# Patient Record
Sex: MALE | Race: WHITE | NOT HISPANIC OR LATINO
[De-identification: names, ages, dates, MRNs, and addresses within clinical notes are randomized per-mention and may not be internally consistent; named-entity substitution may affect disease eponyms.]

---

## 2020-01-06 ENCOUNTER — APPOINTMENT (OUTPATIENT)
Dept: OTOLARYNGOLOGY | Facility: CLINIC | Age: 75
End: 2020-01-06
Payer: MEDICARE

## 2020-01-06 VITALS — BODY MASS INDEX: 30.16 KG/M2 | WEIGHT: 235 LBS | HEIGHT: 74 IN

## 2020-01-06 VITALS
TEMPERATURE: 97.8 F | SYSTOLIC BLOOD PRESSURE: 123 MMHG | HEART RATE: 67 BPM | OXYGEN SATURATION: 99 % | DIASTOLIC BLOOD PRESSURE: 75 MMHG

## 2020-01-06 DIAGNOSIS — Z86.79 PERSONAL HISTORY OF OTHER DISEASES OF THE CIRCULATORY SYSTEM: ICD-10-CM

## 2020-01-06 DIAGNOSIS — H92.09 OTALGIA, UNSPECIFIED EAR: ICD-10-CM

## 2020-01-06 DIAGNOSIS — H90.6 MIXED CONDUCTIVE AND SENSORINEURAL HEARING LOSS, BILATERAL: ICD-10-CM

## 2020-01-06 DIAGNOSIS — Z82.49 FAMILY HISTORY OF ISCHEMIC HEART DISEASE AND OTHER DISEASES OF THE CIRCULATORY SYSTEM: ICD-10-CM

## 2020-01-06 DIAGNOSIS — Z78.9 OTHER SPECIFIED HEALTH STATUS: ICD-10-CM

## 2020-01-06 DIAGNOSIS — H91.90 UNSPECIFIED HEARING LOSS, UNSPECIFIED EAR: ICD-10-CM

## 2020-01-06 DIAGNOSIS — F17.210 NICOTINE DEPENDENCE, CIGARETTES, UNCOMPLICATED: ICD-10-CM

## 2020-01-06 DIAGNOSIS — H92.03 OTALGIA, BILATERAL: ICD-10-CM

## 2020-01-06 PROBLEM — Z00.00 ENCOUNTER FOR PREVENTIVE HEALTH EXAMINATION: Status: ACTIVE | Noted: 2020-01-06

## 2020-01-06 PROCEDURE — 92550 TYMPANOMETRY & REFLEX THRESH: CPT

## 2020-01-06 PROCEDURE — 92557 COMPREHENSIVE HEARING TEST: CPT

## 2020-01-06 PROCEDURE — 99203 OFFICE O/P NEW LOW 30 MIN: CPT

## 2020-01-06 RX ORDER — CANDESARTAN CILEXETIL 4 MG/1
TABLET ORAL
Refills: 0 | Status: ACTIVE | COMMUNITY

## 2020-01-06 RX ORDER — CHLORTHALIDONE 50 MG/1
TABLET ORAL
Refills: 0 | Status: ACTIVE | COMMUNITY

## 2020-01-06 RX ORDER — ASPIRIN 325 MG/1
TABLET, FILM COATED ORAL
Refills: 0 | Status: ACTIVE | COMMUNITY

## 2020-01-06 RX ORDER — EPLERENONE 25 MG/1
25 TABLET, COATED ORAL
Refills: 0 | Status: ACTIVE | COMMUNITY

## 2020-01-06 RX ORDER — ATENOLOL 50 MG/1
50 TABLET ORAL
Refills: 0 | Status: ACTIVE | COMMUNITY

## 2020-01-06 RX ORDER — TAMSULOSIN HYDROCHLORIDE 0.4 MG/1
CAPSULE ORAL
Refills: 0 | Status: ACTIVE | COMMUNITY

## 2020-01-06 RX ORDER — ATORVASTATIN CALCIUM 40 MG/1
40 TABLET, FILM COATED ORAL
Refills: 0 | Status: ACTIVE | COMMUNITY

## 2020-01-06 RX ORDER — ALLOPURINOL 200 MG/1
TABLET ORAL
Refills: 0 | Status: ACTIVE | COMMUNITY

## 2020-01-06 NOTE — HISTORY OF PRESENT ILLNESS
[de-identified] : 75 yo man - he and his wife had uri wife pneumonia and rsv 12'/3-4 caught in [place] - 12/ 9 had blood in ear on left saw urgent care in [place] told tm perf -  took prednisone 4d and then keflex 4d - after this he was told his ears looked better - but he remained congested. He then flew to [place] and ears bothered him more so went to urgent care and was told his ears looked ok but he still felt severe congestion in them- he then noticed his hearing had decreased in b ears. . pain is moderate and dull and switches between the ears. Nonsmoker. No fh related to cc

## 2020-01-10 ENCOUNTER — APPOINTMENT (OUTPATIENT)
Dept: OTOLARYNGOLOGY | Facility: CLINIC | Age: 75
End: 2020-01-10
Payer: MEDICARE

## 2020-01-10 VITALS
DIASTOLIC BLOOD PRESSURE: 84 MMHG | SYSTOLIC BLOOD PRESSURE: 134 MMHG | OXYGEN SATURATION: 99 % | HEART RATE: 66 BPM | TEMPERATURE: 97.5 F | RESPIRATION RATE: 15 BRPM

## 2020-01-10 DIAGNOSIS — H90.3 SENSORINEURAL HEARING LOSS, BILATERAL: ICD-10-CM

## 2020-01-10 PROCEDURE — 92550 TYMPANOMETRY & REFLEX THRESH: CPT

## 2020-01-10 PROCEDURE — 99213 OFFICE O/P EST LOW 20 MIN: CPT

## 2020-01-10 PROCEDURE — 92557 COMPREHENSIVE HEARING TEST: CPT

## 2020-01-10 NOTE — DATA REVIEWED
[de-identified] : ct resolving rik and l tmj - reviewed with pt [de-identified] : b downsloping snhl symmetric ab gaps were small last time but gone now and tymps have better traces.

## 2020-01-10 NOTE — HISTORY OF PRESENT ILLNESS
[de-identified] : followup 73 yo man with b mhl and rik - he reports that his ears have been feeling better over the past few days. came on acutely about a month ago with viral synd. had ct and is here to review. he feels he now has moderate hl; before it was severe.

## 2020-01-13 ENCOUNTER — TRANSCRIPTION ENCOUNTER (OUTPATIENT)
Age: 75
End: 2020-01-13

## 2020-01-15 ENCOUNTER — TRANSCRIPTION ENCOUNTER (OUTPATIENT)
Age: 75
End: 2020-01-15

## 2020-01-24 ENCOUNTER — APPOINTMENT (OUTPATIENT)
Dept: PHARMACY | Facility: CLINIC | Age: 75
End: 2020-01-24

## 2022-07-06 ENCOUNTER — NON-APPOINTMENT (OUTPATIENT)
Age: 77
End: 2022-07-06

## 2023-03-28 ENCOUNTER — NON-APPOINTMENT (OUTPATIENT)
Age: 78
End: 2023-03-28

## 2023-03-28 ENCOUNTER — APPOINTMENT (OUTPATIENT)
Dept: OPHTHALMOLOGY | Facility: CLINIC | Age: 78
End: 2023-03-28
Payer: MEDICARE

## 2023-03-28 PROCEDURE — 92134 CPTRZ OPH DX IMG PST SGM RTA: CPT

## 2023-03-28 PROCEDURE — 92004 COMPRE OPH EXAM NEW PT 1/>: CPT

## 2023-03-28 PROCEDURE — 92136 OPHTHALMIC BIOMETRY: CPT

## 2023-06-18 ENCOUNTER — NON-APPOINTMENT (OUTPATIENT)
Age: 78
End: 2023-06-18

## 2023-07-08 NOTE — OPERATIVE REPORT - OPERATIVE RPOSRT DETAILS
DATE OF SURGERY:July 12, 2023    Surgeon:  Vero Saucedo    PRE-OP DIAGNOSIS: Cataract Right Eye    POST-OP DIAGNOSIS: Same    ANESTHESIA: MAC    PROCEDURE: Cataract extraction with intraocular lens implant Right eye    SPECIMEN/TISSUE REMOVED: None    ESTIMATED BLOOD LOSS: < 1mL    COMPLICATIONS: None    The patient was brought to the operating room.  A drop of topical anesthetic was placed in the eye. The patient was prepped and draped in the usual sterile fashion, including Betadine drops in the eye. A lid speculum was placed between the lids of the right eye. A paracentesis was made superiorly. Intracameral preservative free lidocaine was instilled and the anterior chamber was filled with air, trypan blue, and viscoelastic. A clear cornea temporal incision was created using a 2.4mm keratome. A continuous curvilinear capsulorhexis was started with a cystotome and completed with capsulorhexis forceps. The lens was hydrodissected with BSS. The lens was then phacoemulsified using a divide and conquer technique. Residual cortical material was removed using automated irrigation and aspiration. The capsular bag was reformed using a viscoelastic. A ZCB00 19.00 lens was inserted into the bag. Symmetric capsular bag fixation was confirmed. The remaining viscoelastic was removed with automated irrigation and aspiration. The wounds were hydrated and checked to be water tight. The lid speculum was removed. Antibiotic/steroid ointment was instilled in the eye and a shield was placed over the eye. The patient left the OR in stable condition having tolerated the procedure well. IVero was present throughout the case. DATE OF SURGERY:July 12, 2023    Surgeon:  Vero Saucedo    PRE-OP DIAGNOSIS: Cataract Right Eye, flomax pupil    POST-OP DIAGNOSIS: Same    ANESTHESIA: MAC    PROCEDURE: Cataract extraction with intraocular lens implant Right eye, iris hooks    SPECIMEN/TISSUE REMOVED: None    ESTIMATED BLOOD LOSS: < 1mL    COMPLICATIONS: None    The patient was brought to the operating room.  A drop of topical anesthetic was placed in the eye. The patient was prepped and draped in the usual sterile fashion, including Betadine drops in the eye. A lid speculum was placed between the lids of the right eye. 4 para sites were made and 4 iris hooks were placed for dilation of pupil.  A paracentesis was made superiorly. Intracameral preservative free lidocaine was instilled and the anterior chamber was filled with air, trypan blue, and viscoelastic. A clear cornea temporal incision was created using a 2.4mm keratome. A continuous curvilinear capsulorhexis was started with a cystotome and completed with capsulorhexis forceps. The lens was hydrodissected with BSS. The lens was then phacoemulsified using a divide and conquer technique. Residual cortical material was removed using automated irrigation and aspiration. The capsular bag was reformed using a viscoelastic. A ZCB00 19.00 lens was inserted into the bag. Symmetric capsular bag fixation was confirmed. The remaining viscoelastic was removed with automated irrigation and aspiration. 4 hooks were removed. The wounds were hydrated and checked to be water tight. The lid speculum was removed. Antibiotic/steroid ointment was instilled in the eye and a shield was placed over the eye. The patient left the OR in stable condition having tolerated the procedure well. Vero BHANDARI was present throughout the case.

## 2023-07-11 RX ORDER — SODIUM CHLORIDE 9 MG/ML
1000 INJECTION, SOLUTION INTRAVENOUS
Refills: 0 | Status: DISCONTINUED | OUTPATIENT
Start: 2023-07-12 | End: 2023-07-12

## 2023-07-11 RX ORDER — FENTANYL CITRATE 50 UG/ML
25 INJECTION INTRAVENOUS
Refills: 0 | Status: DISCONTINUED | OUTPATIENT
Start: 2023-07-12 | End: 2023-07-12

## 2023-07-11 RX ORDER — ACETAMINOPHEN 500 MG
1000 TABLET ORAL ONCE
Refills: 0 | Status: DISCONTINUED | OUTPATIENT
Start: 2023-07-12 | End: 2023-07-12

## 2023-07-11 NOTE — ASU PATIENT PROFILE, ADULT - BILL OF RIGHTS/ADMISSION INFORMATION PROVIDED TO:
Received report from Endo nurse, 5700ml aspirated, VSS. Pt to floor at this time, no in distress. Patient

## 2023-07-11 NOTE — ASU PATIENT PROFILE, ADULT - NS PREOP UNDERSTANDS INFO
No solid food/dairy/candy/gum after 10:45pm tonight, water allowed before 05:45am tomorrow; patient reminded to come with photo ID/insurance/credit card; dress in comfortable clothes; no jewelries/valuables/contacts, no smoking/drinking alcohol/recreational drug use today, escort to have a photo, address and callback number was given/yes

## 2023-07-11 NOTE — ASU PATIENT PROFILE, ADULT - NSICDXPASTSURGICALHX_GEN_ALL_CORE_FT
PAST SURGICAL HISTORY:  H/O neck surgery 2014    History of discectomy     History of elbow surgery 2014    S/P total knee replacement, right

## 2023-07-12 ENCOUNTER — NON-APPOINTMENT (OUTPATIENT)
Age: 78
End: 2023-07-12

## 2023-07-12 ENCOUNTER — APPOINTMENT (OUTPATIENT)
Dept: OPHTHALMOLOGY | Facility: AMBULATORY SURGERY CENTER | Age: 78
End: 2023-07-12

## 2023-07-12 ENCOUNTER — OUTPATIENT (OUTPATIENT)
Dept: OUTPATIENT SERVICES | Facility: HOSPITAL | Age: 78
LOS: 1 days | Discharge: HOME | End: 2023-07-12
Payer: MEDICARE

## 2023-07-12 VITALS
TEMPERATURE: 97 F | DIASTOLIC BLOOD PRESSURE: 68 MMHG | OXYGEN SATURATION: 98 % | HEART RATE: 75 BPM | RESPIRATION RATE: 16 BRPM | SYSTOLIC BLOOD PRESSURE: 154 MMHG

## 2023-07-12 VITALS
SYSTOLIC BLOOD PRESSURE: 156 MMHG | RESPIRATION RATE: 16 BRPM | HEIGHT: 75 IN | WEIGHT: 253.75 LBS | OXYGEN SATURATION: 97 % | TEMPERATURE: 98 F | DIASTOLIC BLOOD PRESSURE: 76 MMHG | HEART RATE: 76 BPM

## 2023-07-12 DIAGNOSIS — Z98.890 OTHER SPECIFIED POSTPROCEDURAL STATES: Chronic | ICD-10-CM

## 2023-07-12 DIAGNOSIS — Z96.651 PRESENCE OF RIGHT ARTIFICIAL KNEE JOINT: Chronic | ICD-10-CM

## 2023-07-12 PROCEDURE — 66982 XCAPSL CTRC RMVL CPLX WO ECP: CPT | Mod: RT

## 2023-07-12 DEVICE — LENS IOL TECNIS PROTEC ZCB00 19.0D
Type: IMPLANTABLE DEVICE | Site: RIGHT | Status: NON-FUNCTIONAL
Removed: 2023-07-12

## 2023-07-12 RX ORDER — TROPICAMIDE 1 %
1 DROPS OPHTHALMIC (EYE)
Refills: 0 | Status: COMPLETED | OUTPATIENT
Start: 2023-07-12 | End: 2023-07-12

## 2023-07-12 RX ORDER — CYCLOPENTOLATE HYDROCHLORIDE 10 MG/ML
1 SOLUTION/ DROPS OPHTHALMIC
Refills: 0 | Status: COMPLETED | OUTPATIENT
Start: 2023-07-12 | End: 2023-07-12

## 2023-07-12 RX ORDER — PHENYLEPHRINE HCL 2.5 %
1 DROPS OPHTHALMIC (EYE)
Refills: 0 | Status: COMPLETED | OUTPATIENT
Start: 2023-07-12 | End: 2023-07-12

## 2023-07-12 RX ORDER — KETOROLAC TROMETHAMINE 0.5 %
1 DROPS OPHTHALMIC (EYE)
Refills: 0 | Status: COMPLETED | OUTPATIENT
Start: 2023-07-12 | End: 2023-07-12

## 2023-07-12 RX ORDER — OFLOXACIN 0.3 %
1 DROPS OPHTHALMIC (EYE)
Refills: 0 | Status: COMPLETED | OUTPATIENT
Start: 2023-07-12 | End: 2023-07-12

## 2023-07-12 RX ADMIN — Medication 1 DROP(S): at 08:10

## 2023-07-12 RX ADMIN — CYCLOPENTOLATE HYDROCHLORIDE 1 DROP(S): 10 SOLUTION/ DROPS OPHTHALMIC at 08:10

## 2023-07-12 RX ADMIN — CYCLOPENTOLATE HYDROCHLORIDE 1 DROP(S): 10 SOLUTION/ DROPS OPHTHALMIC at 08:15

## 2023-07-12 RX ADMIN — CYCLOPENTOLATE HYDROCHLORIDE 1 DROP(S): 10 SOLUTION/ DROPS OPHTHALMIC at 08:05

## 2023-07-12 RX ADMIN — Medication 1 DROP(S): at 08:05

## 2023-07-12 RX ADMIN — Medication 1 DROP(S): at 08:15

## 2023-07-12 RX ADMIN — SODIUM CHLORIDE 100 MILLILITER(S): 9 INJECTION, SOLUTION INTRAVENOUS at 10:29

## 2023-07-13 ENCOUNTER — APPOINTMENT (OUTPATIENT)
Dept: OPHTHALMOLOGY | Facility: CLINIC | Age: 78
End: 2023-07-13
Payer: MEDICARE

## 2023-07-13 ENCOUNTER — NON-APPOINTMENT (OUTPATIENT)
Age: 78
End: 2023-07-13

## 2023-07-13 PROCEDURE — 99024 POSTOP FOLLOW-UP VISIT: CPT

## 2023-07-19 ENCOUNTER — NON-APPOINTMENT (OUTPATIENT)
Age: 78
End: 2023-07-19

## 2023-07-19 ENCOUNTER — APPOINTMENT (OUTPATIENT)
Dept: OPHTHALMOLOGY | Facility: CLINIC | Age: 78
End: 2023-07-19
Payer: MEDICARE

## 2023-07-19 PROCEDURE — 99024 POSTOP FOLLOW-UP VISIT: CPT

## 2023-07-20 PROBLEM — M10.9 GOUT, UNSPECIFIED: Chronic | Status: ACTIVE | Noted: 2023-07-11

## 2023-07-20 PROBLEM — E78.5 HYPERLIPIDEMIA, UNSPECIFIED: Chronic | Status: ACTIVE | Noted: 2023-07-11

## 2023-07-20 PROBLEM — I10 ESSENTIAL (PRIMARY) HYPERTENSION: Chronic | Status: ACTIVE | Noted: 2023-07-11

## 2023-07-21 NOTE — OPERATIVE REPORT - OPERATIVE RPOSRT DETAILS
DATE OF SURGERY:July 26,2023    Surgeon:  Vero Saucedo    PRE-OP DIAGNOSIS: Cataract Left Eye; Small Pupil    POST-OP DIAGNOSIS: Same    ANESTHESIA: MAC    PROCEDURE: Cataract extraction with intraocular lens implant left eye, iris hooks    SPECIMEN/TISSUE REMOVED: None    ESTIMATED BLOOD LOSS: < 1mL    COMPLICATIONS: None    The patient was brought to the operating room.  A drop of topical anesthetic was placed in the eye. The patient was prepped and draped in the usual sterile fashion, including Betadine drops in the eye. A lid speculum was placed between the lids of the left eye. A paracentesis was made inferior as well as 4 paracentesis sites for the introduction of iris hooks. 4 iris hooks were placed to dilate and control the pupil.  Intracameral preservative free lidocaine was instilled and the anterior chamber was filled with air, trypan blue, and viscoelastic.. A clear cornea temporal incision was created using a 2.4mm keratome. A continuous curvilinear capsulorhexis was started with a cystotome and completed with capsulorhexis forceps. The lens was hydrodissected with BSS. The lens was then phacoemulsified using a divide and conquer technique. Residual cortical material was removed using automated irrigation and aspiration. The capsular bag was reformed using a viscoelastic. A SA60WF 19.00 lens was inserted into the bag. Symmetric capsular bag fixation was confirmed. The remaining viscoelastic was removed with automated irrigation and aspiration. The 4 iris hooks were removed. The wounds were hydrated and checked to be water tight. The lid speculum was removed. Antibiotic/steroid ointment was instilled in the eye, and a shield was placed over the eye. The patient left the OR in stable condition having tolerated the procedure well. IVero was present throughout the case.

## 2023-07-24 RX ORDER — KETOROLAC TROMETHAMINE 0.5 %
1 DROPS OPHTHALMIC (EYE)
Refills: 0 | Status: DISCONTINUED | OUTPATIENT
Start: 2023-07-26 | End: 2023-07-26

## 2023-07-24 RX ORDER — CYCLOPENTOLATE HYDROCHLORIDE 10 MG/ML
1 SOLUTION/ DROPS OPHTHALMIC
Refills: 0 | Status: DISCONTINUED | OUTPATIENT
Start: 2023-07-26 | End: 2023-07-26

## 2023-07-25 NOTE — ASU PATIENT PROFILE, ADULT - FALL HARM RISK - UNIVERSAL INTERVENTIONS
Bed in lowest position, wheels locked, appropriate side rails in place/Call bell, personal items and telephone in reach/Instruct patient to call for assistance before getting out of bed or chair/Non-slip footwear when patient is out of bed/[place] to call system/Physically safe environment - no spills, clutter or unnecessary equipment/Purposeful Proactive Rounding/Room/bathroom lighting operational, light cord in reach

## 2023-07-25 NOTE — ASU PATIENT PROFILE, ADULT - TOBACCO USE
Called and informed pt of all urine testing results. Pt and mother whom was present with verbalized understanding. No other questions or concerns at this time.   Former smoker

## 2023-07-25 NOTE — ASU PATIENT PROFILE, ADULT - VISION (WITH CORRECTIVE LENSES IF THE PATIENT USUALLY WEARS THEM):
glasses/Partially impaired: cannot see medication labels or newsprint, but can see obstacles in path, and the surrounding layout; can count fingers at arm's length Yes

## 2023-07-25 NOTE — ASU PATIENT PROFILE, ADULT - NSICDXPASTSURGICALHX_GEN_ALL_CORE_FT
PAST SURGICAL HISTORY:  H/O neck surgery 2014    History of discectomy     History of elbow surgery 2014    S/P total knee replacement, right      PAST SURGICAL HISTORY:  H/O cataract extraction     H/O neck surgery 2014    History of discectomy     History of elbow surgery 2014    S/P total knee replacement, right

## 2023-07-25 NOTE — ASU PATIENT PROFILE, ADULT - NS PREOP UNDERSTANDS INFO
No solid food/dairy/candy/gum after 10pm tonight, water allowed before 05am tomorrow; patient reminded to come with photo ID/insurance/credit card; dress in comfortable clothes; no jewelries/valuables/contacts, no smoking/drinking alcohol/recreational drug use today, escort to have a photo, address and callback number was given/yes

## 2023-07-26 ENCOUNTER — NON-APPOINTMENT (OUTPATIENT)
Age: 78
End: 2023-07-26

## 2023-07-26 ENCOUNTER — APPOINTMENT (OUTPATIENT)
Dept: OPHTHALMOLOGY | Facility: AMBULATORY SURGERY CENTER | Age: 78
End: 2023-07-26

## 2023-07-26 ENCOUNTER — OUTPATIENT (OUTPATIENT)
Dept: OUTPATIENT SERVICES | Facility: HOSPITAL | Age: 78
LOS: 1 days | Discharge: HOME | End: 2023-07-26
Payer: MEDICARE

## 2023-07-26 VITALS
SYSTOLIC BLOOD PRESSURE: 142 MMHG | TEMPERATURE: 98 F | DIASTOLIC BLOOD PRESSURE: 66 MMHG | RESPIRATION RATE: 17 BRPM | HEART RATE: 71 BPM | WEIGHT: 252.43 LBS | OXYGEN SATURATION: 97 % | HEIGHT: 75 IN

## 2023-07-26 VITALS
SYSTOLIC BLOOD PRESSURE: 149 MMHG | RESPIRATION RATE: 16 BRPM | DIASTOLIC BLOOD PRESSURE: 64 MMHG | OXYGEN SATURATION: 96 % | TEMPERATURE: 98 F | HEART RATE: 68 BPM

## 2023-07-26 DIAGNOSIS — Z98.890 OTHER SPECIFIED POSTPROCEDURAL STATES: Chronic | ICD-10-CM

## 2023-07-26 DIAGNOSIS — Z96.651 PRESENCE OF RIGHT ARTIFICIAL KNEE JOINT: Chronic | ICD-10-CM

## 2023-07-26 DIAGNOSIS — Z98.49 CATARACT EXTRACTION STATUS, UNSPECIFIED EYE: Chronic | ICD-10-CM

## 2023-07-26 PROCEDURE — 92136 OPHTHALMIC BIOMETRY: CPT | Mod: 26,LT

## 2023-07-26 PROCEDURE — 66982 XCAPSL CTRC RMVL CPLX WO ECP: CPT | Mod: 79,LT

## 2023-07-26 DEVICE — LENS IOL ACRYSOF NATURAL CLEAR 19.0 D
Type: IMPLANTABLE DEVICE | Site: LEFT | Status: NON-FUNCTIONAL
Removed: 2023-07-26

## 2023-07-26 RX ORDER — BUPROPION HYDROCHLORIDE 150 MG/1
1 TABLET, EXTENDED RELEASE ORAL
Refills: 0 | DISCHARGE

## 2023-07-26 RX ORDER — CHLORTHALIDONE 50 MG
12.5 TABLET ORAL
Refills: 0 | DISCHARGE

## 2023-07-26 RX ORDER — TAMSULOSIN HYDROCHLORIDE 0.4 MG/1
1 CAPSULE ORAL
Refills: 0 | DISCHARGE

## 2023-07-26 RX ORDER — CANDESARTAN CILEXETIL 8 MG/1
1 TABLET ORAL
Refills: 0 | DISCHARGE

## 2023-07-26 RX ORDER — SODIUM CHLORIDE 9 MG/ML
500 INJECTION INTRAMUSCULAR; INTRAVENOUS; SUBCUTANEOUS
Refills: 0 | Status: DISCONTINUED | OUTPATIENT
Start: 2023-07-26 | End: 2023-07-26

## 2023-07-26 RX ORDER — PHENYLEPHRINE HCL 2.5 %
1 DROPS OPHTHALMIC (EYE)
Refills: 0 | Status: COMPLETED | OUTPATIENT
Start: 2023-07-26 | End: 2023-07-26

## 2023-07-26 RX ORDER — ASPIRIN/CALCIUM CARB/MAGNESIUM 324 MG
1 TABLET ORAL
Refills: 0 | DISCHARGE

## 2023-07-26 RX ORDER — ZOLPIDEM TARTRATE 10 MG/1
1 TABLET ORAL
Refills: 0 | DISCHARGE

## 2023-07-26 RX ORDER — EPLERENONE 50 MG/1
1 TABLET, FILM COATED ORAL
Refills: 0 | DISCHARGE

## 2023-07-26 RX ORDER — ONDANSETRON 8 MG/1
4 TABLET, FILM COATED ORAL ONCE
Refills: 0 | Status: DISCONTINUED | OUTPATIENT
Start: 2023-07-26 | End: 2023-07-26

## 2023-07-26 RX ORDER — TROPICAMIDE 1 %
1 DROPS OPHTHALMIC (EYE)
Refills: 0 | Status: COMPLETED | OUTPATIENT
Start: 2023-07-26 | End: 2023-07-26

## 2023-07-26 RX ORDER — ATENOLOL 25 MG/1
1 TABLET ORAL
Refills: 0 | DISCHARGE

## 2023-07-26 RX ORDER — OFLOXACIN 0.3 %
1 DROPS OPHTHALMIC (EYE)
Refills: 0 | Status: COMPLETED | OUTPATIENT
Start: 2023-07-26 | End: 2023-07-26

## 2023-07-26 RX ORDER — ACETAMINOPHEN 500 MG
650 TABLET ORAL ONCE
Refills: 0 | Status: DISCONTINUED | OUTPATIENT
Start: 2023-07-26 | End: 2023-07-26

## 2023-07-26 RX ORDER — ALLOPURINOL 300 MG
1 TABLET ORAL
Refills: 0 | DISCHARGE

## 2023-07-26 RX ORDER — ATORVASTATIN CALCIUM 80 MG/1
1 TABLET, FILM COATED ORAL
Refills: 0 | DISCHARGE

## 2023-07-26 RX ORDER — ERGOCALCIFEROL 1.25 MG/1
1 CAPSULE ORAL
Refills: 0 | DISCHARGE

## 2023-07-26 RX ADMIN — Medication 1 DROP(S): at 07:53

## 2023-07-26 RX ADMIN — Medication 1 DROP(S): at 07:43

## 2023-07-26 RX ADMIN — Medication 1 DROP(S): at 07:58

## 2023-07-26 RX ADMIN — CYCLOPENTOLATE HYDROCHLORIDE 1 DROP(S): 10 SOLUTION/ DROPS OPHTHALMIC at 07:43

## 2023-07-26 RX ADMIN — CYCLOPENTOLATE HYDROCHLORIDE 1 DROP(S): 10 SOLUTION/ DROPS OPHTHALMIC at 07:53

## 2023-07-26 RX ADMIN — Medication 1 DROP(S): at 07:54

## 2023-07-26 RX ADMIN — Medication 1 DROP(S): at 07:42

## 2023-07-26 NOTE — ASU PREOP CHECKLIST - BSA (M2)
Render Note In Bullet Format When Appropriate: No Application Tool (Optional): Liquid Nitrogen Sprayer Show Applicator Variable?: Yes Duration Of Freeze Thaw-Cycle (Seconds): 5 Detail Level: Detailed Number Of Freeze-Thaw Cycles: 1 freeze-thaw cycle Post-Care Instructions: I reviewed with the patient in detail post-care instructions. Patient is to wear sunprotection, and avoid picking at any of the treated lesions. Pt may apply Vaseline to crusted or scabbing areas. Consent: The patient's consent was obtained including but not limited to risks of crusting, scabbing, blistering, scarring, darker or lighter pigmentary change, recurrence, incomplete removal and infection. 2.42

## 2023-07-27 ENCOUNTER — NON-APPOINTMENT (OUTPATIENT)
Age: 78
End: 2023-07-27

## 2023-07-27 ENCOUNTER — APPOINTMENT (OUTPATIENT)
Dept: OPHTHALMOLOGY | Facility: CLINIC | Age: 78
End: 2023-07-27
Payer: MEDICARE

## 2023-07-27 PROCEDURE — 99024 POSTOP FOLLOW-UP VISIT: CPT

## 2023-08-01 ENCOUNTER — APPOINTMENT (OUTPATIENT)
Dept: OPHTHALMOLOGY | Facility: CLINIC | Age: 78
End: 2023-08-01
Payer: MEDICARE

## 2023-08-01 ENCOUNTER — NON-APPOINTMENT (OUTPATIENT)
Age: 78
End: 2023-08-01

## 2023-08-01 PROCEDURE — 99024 POSTOP FOLLOW-UP VISIT: CPT

## 2023-08-02 ENCOUNTER — APPOINTMENT (OUTPATIENT)
Dept: OPHTHALMOLOGY | Facility: CLINIC | Age: 78
End: 2023-08-02

## 2023-09-05 ENCOUNTER — NON-APPOINTMENT (OUTPATIENT)
Age: 78
End: 2023-09-05

## 2023-09-05 ENCOUNTER — APPOINTMENT (OUTPATIENT)
Dept: OPHTHALMOLOGY | Facility: CLINIC | Age: 78
End: 2023-09-05
Payer: MEDICARE

## 2023-09-05 PROCEDURE — 92012 INTRM OPH EXAM EST PATIENT: CPT | Mod: 24

## 2023-11-14 ENCOUNTER — NON-APPOINTMENT (OUTPATIENT)
Age: 78
End: 2023-11-14

## 2023-11-14 ENCOUNTER — APPOINTMENT (OUTPATIENT)
Dept: OPHTHALMOLOGY | Facility: CLINIC | Age: 78
End: 2023-11-14
Payer: MEDICARE

## 2023-11-14 PROCEDURE — 92083 EXTENDED VISUAL FIELD XM: CPT

## 2023-11-14 PROCEDURE — 92014 COMPRE OPH EXAM EST PT 1/>: CPT

## 2023-11-14 PROCEDURE — 92133 CPTRZD OPH DX IMG PST SGM ON: CPT

## 2024-01-30 ENCOUNTER — NON-APPOINTMENT (OUTPATIENT)
Age: 79
End: 2024-01-30

## 2024-01-30 ENCOUNTER — APPOINTMENT (OUTPATIENT)
Dept: OPHTHALMOLOGY | Facility: CLINIC | Age: 79
End: 2024-01-30
Payer: MEDICARE

## 2024-01-30 PROCEDURE — 92012 INTRM OPH EXAM EST PATIENT: CPT

## 2024-02-02 ENCOUNTER — NON-APPOINTMENT (OUTPATIENT)
Age: 79
End: 2024-02-02

## 2024-02-09 ENCOUNTER — NON-APPOINTMENT (OUTPATIENT)
Age: 79
End: 2024-02-09

## 2024-02-09 ENCOUNTER — APPOINTMENT (OUTPATIENT)
Dept: OPHTHALMOLOGY | Facility: CLINIC | Age: 79
End: 2024-02-09
Payer: MEDICARE

## 2024-02-09 PROCEDURE — 92012 INTRM OPH EXAM EST PATIENT: CPT

## 2024-05-14 ENCOUNTER — NON-APPOINTMENT (OUTPATIENT)
Age: 79
End: 2024-05-14

## 2024-05-14 ENCOUNTER — APPOINTMENT (OUTPATIENT)
Dept: OPHTHALMOLOGY | Facility: CLINIC | Age: 79
End: 2024-05-14
Payer: MEDICARE

## 2024-05-14 PROCEDURE — 92012 INTRM OPH EXAM EST PATIENT: CPT

## 2025-05-15 NOTE — ASU PREOP CHECKLIST - ISOLATION PRECAUTIONS
none A referral is currently in progress at the following clinic for psychiatric follow-up:  [address] [address]  [phone] ext. [phone]  If you don't hear from them by the end of the week please give them a call.

## 2025-05-22 ENCOUNTER — NON-APPOINTMENT (OUTPATIENT)
Age: 80
End: 2025-05-22

## 2025-05-22 ENCOUNTER — APPOINTMENT (OUTPATIENT)
Dept: OPHTHALMOLOGY | Facility: CLINIC | Age: 80
End: 2025-05-22
Payer: MEDICARE

## 2025-05-22 PROCEDURE — 92133 CPTRZD OPH DX IMG PST SGM ON: CPT

## 2025-05-22 PROCEDURE — 92014 COMPRE OPH EXAM EST PT 1/>: CPT

## 2025-05-22 PROCEDURE — 92083 EXTENDED VISUAL FIELD XM: CPT

## 2025-06-09 ENCOUNTER — NON-APPOINTMENT (OUTPATIENT)
Age: 80
End: 2025-06-09

## 2025-06-10 ENCOUNTER — APPOINTMENT (OUTPATIENT)
Dept: OPHTHALMOLOGY | Facility: CLINIC | Age: 80
End: 2025-06-10
Payer: MEDICARE

## 2025-06-10 ENCOUNTER — NON-APPOINTMENT (OUTPATIENT)
Age: 80
End: 2025-06-10

## 2025-06-10 PROCEDURE — 92083 EXTENDED VISUAL FIELD XM: CPT

## 2025-06-10 PROCEDURE — 92012 INTRM OPH EXAM EST PATIENT: CPT

## (undated) DEVICE — TRANSFORMER INTREPID I/A 0.3MM

## (undated) DEVICE — SUT NYLON 10-0 12" CU-5

## (undated) DEVICE — GOWN SLEEVES

## (undated) DEVICE — TIP OZIL 12 DEGREE MINI FLARE

## (undated) DEVICE — PACK ANTERIOR SEGMENT

## (undated) DEVICE — SOL SYR OPHTHALMIC VISION BLUE TRYPAN BLUE 0.06% 0.5 ML

## (undated) DEVICE — SOL IRR BAL SALT 500ML

## (undated) DEVICE — KNIFE ALCON PARACENTESIS CLEARCUT SIDEPORT 1MM (YELLOW)

## (undated) DEVICE — PACK CENTURION 2.4MM

## (undated) DEVICE — DRAPE MICROSCOPE KNOB COVER SMALL (2 PCS)

## (undated) DEVICE — Device

## (undated) DEVICE — GLV 6.5 PROTEXIS W HYDROGEL

## (undated) DEVICE — CANNULA IRR ANT CHAMBER 30G